# Patient Record
Sex: FEMALE | Race: WHITE | NOT HISPANIC OR LATINO | Employment: OTHER
[De-identification: names, ages, dates, MRNs, and addresses within clinical notes are randomized per-mention and may not be internally consistent; named-entity substitution may affect disease eponyms.]

---

## 2017-09-01 ENCOUNTER — HEALTH MAINTENANCE LETTER (OUTPATIENT)
Age: 62
End: 2017-09-01

## 2018-09-07 ENCOUNTER — HEALTH MAINTENANCE LETTER (OUTPATIENT)
Age: 63
End: 2018-09-07

## 2021-07-15 ENCOUNTER — HOSPITAL ENCOUNTER (OUTPATIENT)
Facility: CLINIC | Age: 66
Setting detail: OBSERVATION
Discharge: HOME | End: 2021-07-16
Attending: EMERGENCY MEDICINE | Admitting: EMERGENCY MEDICINE
Payer: MEDICARE

## 2021-07-15 ENCOUNTER — APPOINTMENT (OUTPATIENT)
Dept: MRI IMAGING | Facility: CLINIC | Age: 66
End: 2021-07-15
Attending: HOSPITALIST
Payer: MEDICARE

## 2021-07-15 ENCOUNTER — APPOINTMENT (OUTPATIENT)
Dept: CARDIOLOGY | Facility: CLINIC | Age: 66
End: 2021-07-15
Attending: HOSPITALIST
Payer: MEDICARE

## 2021-07-15 ENCOUNTER — APPOINTMENT (OUTPATIENT)
Dept: CT IMAGING | Facility: CLINIC | Age: 66
End: 2021-07-15
Attending: EMERGENCY MEDICINE
Payer: MEDICARE

## 2021-07-15 DIAGNOSIS — R42 VERTIGO: ICD-10-CM

## 2021-07-15 DIAGNOSIS — S06.0X1A CONCUSSION WITH LOSS OF CONSCIOUSNESS OF 30 MINUTES OR LESS, INITIAL ENCOUNTER: ICD-10-CM

## 2021-07-15 DIAGNOSIS — R53.83 FATIGUE, UNSPECIFIED TYPE: ICD-10-CM

## 2021-07-15 DIAGNOSIS — M62.81 GENERALIZED MUSCLE WEAKNESS: ICD-10-CM

## 2021-07-15 LAB
ALBUMIN SERPL-MCNC: 3.3 G/DL (ref 3.4–5)
ALBUMIN UR-MCNC: NEGATIVE MG/DL
ALP SERPL-CCNC: 80 U/L (ref 40–150)
ALT SERPL W P-5'-P-CCNC: 23 U/L (ref 0–50)
ANION GAP SERPL CALCULATED.3IONS-SCNC: 2 MMOL/L (ref 3–14)
APPEARANCE UR: CLEAR
AST SERPL W P-5'-P-CCNC: 26 U/L (ref 0–45)
ATRIAL RATE - MUSE: 73 BPM
BASOPHILS # BLD AUTO: 0.1 10E3/UL (ref 0–0.2)
BASOPHILS NFR BLD AUTO: 1 %
BILIRUB DIRECT SERPL-MCNC: 0.1 MG/DL (ref 0–0.2)
BILIRUB SERPL-MCNC: 0.8 MG/DL (ref 0.2–1.3)
BILIRUB UR QL STRIP: NEGATIVE
BUN SERPL-MCNC: 7 MG/DL (ref 7–30)
CALCIUM SERPL-MCNC: 8.7 MG/DL (ref 8.5–10.1)
CHLORIDE BLD-SCNC: 104 MMOL/L (ref 94–109)
CK SERPL-CCNC: 52 U/L (ref 30–225)
CO2 SERPL-SCNC: 29 MMOL/L (ref 20–32)
COLOR UR AUTO: ABNORMAL
CREAT SERPL-MCNC: 0.51 MG/DL (ref 0.52–1.04)
CRP SERPL-MCNC: <2.9 MG/L (ref 0–8)
DIASTOLIC BLOOD PRESSURE - MUSE: NORMAL MMHG
EOSINOPHIL # BLD AUTO: 0.5 10E3/UL (ref 0–0.7)
EOSINOPHIL NFR BLD AUTO: 5 %
ERYTHROCYTE [DISTWIDTH] IN BLOOD BY AUTOMATED COUNT: 12.8 % (ref 10–15)
GFR SERPL CREATININE-BSD FRML MDRD: >90 ML/MIN/1.73M2
GLUCOSE BLD-MCNC: 114 MG/DL (ref 70–99)
GLUCOSE UR STRIP-MCNC: NEGATIVE MG/DL
HBA1C MFR BLD: 4.9 % (ref 0–5.6)
HCT VFR BLD AUTO: 43.3 % (ref 35–47)
HGB BLD-MCNC: 14.7 G/DL (ref 11.7–15.7)
HGB UR QL STRIP: NEGATIVE
HOLD SPECIMEN: NORMAL
IMM GRANULOCYTES # BLD: 0.1 10E3/UL
IMM GRANULOCYTES NFR BLD: 1 %
INTERPRETATION ECG - MUSE: NORMAL
KETONES UR STRIP-MCNC: NEGATIVE MG/DL
LEUKOCYTE ESTERASE UR QL STRIP: ABNORMAL
LVEF ECHO: NORMAL
LYMPHOCYTES # BLD AUTO: 2.3 10E3/UL (ref 0.8–5.3)
LYMPHOCYTES NFR BLD AUTO: 25 %
MAGNESIUM SERPL-MCNC: 2.3 MG/DL (ref 1.6–2.3)
MCH RBC QN AUTO: 32.2 PG (ref 26.5–33)
MCHC RBC AUTO-ENTMCNC: 33.9 G/DL (ref 31.5–36.5)
MCV RBC AUTO: 95 FL (ref 78–100)
MONOCYTES # BLD AUTO: 0.6 10E3/UL (ref 0–1.3)
MONOCYTES NFR BLD AUTO: 7 %
NEUTROPHILS # BLD AUTO: 5.8 10E3/UL (ref 1.6–8.3)
NEUTROPHILS NFR BLD AUTO: 61 %
NITRATE UR QL: NEGATIVE
NRBC # BLD AUTO: 0 10E3/UL
NRBC BLD AUTO-RTO: 0 /100
P AXIS - MUSE: 61 DEGREES
PH UR STRIP: 7.5 [PH] (ref 5–7)
PLATELET # BLD AUTO: 315 10E3/UL (ref 150–450)
POTASSIUM BLD-SCNC: 3.6 MMOL/L (ref 3.4–5.3)
PR INTERVAL - MUSE: 220 MS
PROT SERPL-MCNC: 6.7 G/DL (ref 6.8–8.8)
QRS DURATION - MUSE: 88 MS
QT - MUSE: 444 MS
QTC - MUSE: 489 MS
R AXIS - MUSE: 23 DEGREES
RBC # BLD AUTO: 4.57 10E6/UL (ref 3.8–5.2)
RBC URINE: 35 /HPF
SARS-COV-2 RNA RESP QL NAA+PROBE: NEGATIVE
SODIUM SERPL-SCNC: 135 MMOL/L (ref 133–144)
SP GR UR STRIP: 1.02 (ref 1–1.03)
SYSTOLIC BLOOD PRESSURE - MUSE: NORMAL MMHG
T AXIS - MUSE: 37 DEGREES
TROPONIN I SERPL-MCNC: <0.015 UG/L (ref 0–0.04)
UROBILINOGEN UR STRIP-MCNC: NORMAL MG/DL
VENTRICULAR RATE- MUSE: 73 BPM
WBC # BLD AUTO: 9.4 10E3/UL (ref 4–11)
WBC URINE: 10 /HPF

## 2021-07-15 PROCEDURE — 36415 COLL VENOUS BLD VENIPUNCTURE: CPT | Performed by: EMERGENCY MEDICINE

## 2021-07-15 PROCEDURE — 87086 URINE CULTURE/COLONY COUNT: CPT | Performed by: EMERGENCY MEDICINE

## 2021-07-15 PROCEDURE — 83036 HEMOGLOBIN GLYCOSYLATED A1C: CPT | Performed by: HOSPITALIST

## 2021-07-15 PROCEDURE — 250N000011 HC RX IP 250 OP 636: Performed by: EMERGENCY MEDICINE

## 2021-07-15 PROCEDURE — 70496 CT ANGIOGRAPHY HEAD: CPT | Mod: MG

## 2021-07-15 PROCEDURE — 86140 C-REACTIVE PROTEIN: CPT | Performed by: HOSPITALIST

## 2021-07-15 PROCEDURE — 250N000013 HC RX MED GY IP 250 OP 250 PS 637: Mod: GY | Performed by: HOSPITALIST

## 2021-07-15 PROCEDURE — 70553 MRI BRAIN STEM W/O & W/DYE: CPT | Mod: MG

## 2021-07-15 PROCEDURE — 12013 RPR F/E/E/N/L/M 2.6-5.0 CM: CPT

## 2021-07-15 PROCEDURE — 36415 COLL VENOUS BLD VENIPUNCTURE: CPT | Performed by: HOSPITALIST

## 2021-07-15 PROCEDURE — 36592 COLLECT BLOOD FROM PICC: CPT | Performed by: EMERGENCY MEDICINE

## 2021-07-15 PROCEDURE — 83735 ASSAY OF MAGNESIUM: CPT | Performed by: HOSPITALIST

## 2021-07-15 PROCEDURE — 96375 TX/PRO/DX INJ NEW DRUG ADDON: CPT

## 2021-07-15 PROCEDURE — 82550 ASSAY OF CK (CPK): CPT | Performed by: HOSPITALIST

## 2021-07-15 PROCEDURE — 81001 URINALYSIS AUTO W/SCOPE: CPT | Performed by: EMERGENCY MEDICINE

## 2021-07-15 PROCEDURE — 255N000002 HC RX 255 OP 636: Performed by: HOSPITALIST

## 2021-07-15 PROCEDURE — 80048 BASIC METABOLIC PNL TOTAL CA: CPT | Performed by: EMERGENCY MEDICINE

## 2021-07-15 PROCEDURE — G0378 HOSPITAL OBSERVATION PER HR: HCPCS

## 2021-07-15 PROCEDURE — 99220 PR INITIAL OBSERVATION CARE,LEVEL III: CPT | Performed by: HOSPITALIST

## 2021-07-15 PROCEDURE — C9803 HOPD COVID-19 SPEC COLLECT: HCPCS

## 2021-07-15 PROCEDURE — 93005 ELECTROCARDIOGRAM TRACING: CPT

## 2021-07-15 PROCEDURE — 96374 THER/PROPH/DIAG INJ IV PUSH: CPT | Mod: 59

## 2021-07-15 PROCEDURE — 87635 SARS-COV-2 COVID-19 AMP PRB: CPT | Performed by: EMERGENCY MEDICINE

## 2021-07-15 PROCEDURE — 250N000013 HC RX MED GY IP 250 OP 250 PS 637: Performed by: EMERGENCY MEDICINE

## 2021-07-15 PROCEDURE — 258N000003 HC RX IP 258 OP 636: Performed by: HOSPITALIST

## 2021-07-15 PROCEDURE — 70450 CT HEAD/BRAIN W/O DYE: CPT | Mod: ME,XS

## 2021-07-15 PROCEDURE — A9585 GADOBUTROL INJECTION: HCPCS | Performed by: HOSPITALIST

## 2021-07-15 PROCEDURE — 250N000009 HC RX 250: Performed by: EMERGENCY MEDICINE

## 2021-07-15 PROCEDURE — 258N000003 HC RX IP 258 OP 636: Performed by: EMERGENCY MEDICINE

## 2021-07-15 PROCEDURE — 85025 COMPLETE CBC W/AUTO DIFF WBC: CPT | Performed by: EMERGENCY MEDICINE

## 2021-07-15 PROCEDURE — 93306 TTE W/DOPPLER COMPLETE: CPT

## 2021-07-15 PROCEDURE — 96361 HYDRATE IV INFUSION ADD-ON: CPT

## 2021-07-15 PROCEDURE — 84484 ASSAY OF TROPONIN QUANT: CPT | Performed by: EMERGENCY MEDICINE

## 2021-07-15 PROCEDURE — 93306 TTE W/DOPPLER COMPLETE: CPT | Mod: 26 | Performed by: INTERNAL MEDICINE

## 2021-07-15 PROCEDURE — 99285 EMERGENCY DEPT VISIT HI MDM: CPT | Mod: 25

## 2021-07-15 PROCEDURE — 82248 BILIRUBIN DIRECT: CPT | Performed by: HOSPITALIST

## 2021-07-15 RX ORDER — NALOXONE HYDROCHLORIDE 0.4 MG/ML
0.2 INJECTION, SOLUTION INTRAMUSCULAR; INTRAVENOUS; SUBCUTANEOUS
Status: DISCONTINUED | OUTPATIENT
Start: 2021-07-15 | End: 2021-07-16 | Stop reason: HOSPADM

## 2021-07-15 RX ORDER — ACETAMINOPHEN 325 MG/1
650 TABLET ORAL EVERY 6 HOURS PRN
Status: DISCONTINUED | OUTPATIENT
Start: 2021-07-15 | End: 2021-07-16 | Stop reason: HOSPADM

## 2021-07-15 RX ORDER — LIDOCAINE 40 MG/G
CREAM TOPICAL
Status: DISCONTINUED | OUTPATIENT
Start: 2021-07-15 | End: 2021-07-16 | Stop reason: HOSPADM

## 2021-07-15 RX ORDER — ACETAMINOPHEN 650 MG/1
650 SUPPOSITORY RECTAL EVERY 6 HOURS PRN
Status: DISCONTINUED | OUTPATIENT
Start: 2021-07-15 | End: 2021-07-16 | Stop reason: HOSPADM

## 2021-07-15 RX ORDER — MECLIZINE HCL 12.5 MG 12.5 MG/1
12.5 TABLET ORAL 3 TIMES DAILY PRN
Status: DISCONTINUED | OUTPATIENT
Start: 2021-07-15 | End: 2021-07-16 | Stop reason: HOSPADM

## 2021-07-15 RX ORDER — IOPAMIDOL 755 MG/ML
90 INJECTION, SOLUTION INTRAVASCULAR ONCE
Status: COMPLETED | OUTPATIENT
Start: 2021-07-15 | End: 2021-07-15

## 2021-07-15 RX ORDER — NICOTINE 21 MG/24HR
1 PATCH, TRANSDERMAL 24 HOURS TRANSDERMAL DAILY
Status: DISCONTINUED | OUTPATIENT
Start: 2021-07-15 | End: 2021-07-16 | Stop reason: HOSPADM

## 2021-07-15 RX ORDER — ONDANSETRON 2 MG/ML
4 INJECTION INTRAMUSCULAR; INTRAVENOUS EVERY 30 MIN PRN
Status: DISCONTINUED | OUTPATIENT
Start: 2021-07-15 | End: 2021-07-15

## 2021-07-15 RX ORDER — GADOBUTROL 604.72 MG/ML
5 INJECTION INTRAVENOUS ONCE
Status: COMPLETED | OUTPATIENT
Start: 2021-07-15 | End: 2021-07-15

## 2021-07-15 RX ORDER — NALOXONE HYDROCHLORIDE 0.4 MG/ML
0.4 INJECTION, SOLUTION INTRAMUSCULAR; INTRAVENOUS; SUBCUTANEOUS
Status: DISCONTINUED | OUTPATIENT
Start: 2021-07-15 | End: 2021-07-16 | Stop reason: HOSPADM

## 2021-07-15 RX ORDER — ACETAMINOPHEN 325 MG/1
650 TABLET ORAL ONCE
Status: COMPLETED | OUTPATIENT
Start: 2021-07-15 | End: 2021-07-15

## 2021-07-15 RX ORDER — SODIUM CHLORIDE 9 MG/ML
INJECTION, SOLUTION INTRAVENOUS CONTINUOUS
Status: ACTIVE | OUTPATIENT
Start: 2021-07-15 | End: 2021-07-16

## 2021-07-15 RX ORDER — MECLIZINE HYDROCHLORIDE 25 MG/1
25 TABLET ORAL ONCE
Status: COMPLETED | OUTPATIENT
Start: 2021-07-15 | End: 2021-07-15

## 2021-07-15 RX ORDER — ONDANSETRON 2 MG/ML
4 INJECTION INTRAMUSCULAR; INTRAVENOUS EVERY 6 HOURS PRN
Status: DISCONTINUED | OUTPATIENT
Start: 2021-07-15 | End: 2021-07-16

## 2021-07-15 RX ORDER — ONDANSETRON 2 MG/ML
4 INJECTION INTRAMUSCULAR; INTRAVENOUS EVERY 6 HOURS PRN
Status: DISCONTINUED | OUTPATIENT
Start: 2021-07-15 | End: 2021-07-16 | Stop reason: HOSPADM

## 2021-07-15 RX ORDER — ONDANSETRON 4 MG/1
4 TABLET, ORALLY DISINTEGRATING ORAL EVERY 6 HOURS PRN
Status: DISCONTINUED | OUTPATIENT
Start: 2021-07-15 | End: 2021-07-16 | Stop reason: HOSPADM

## 2021-07-15 RX ORDER — LORAZEPAM 2 MG/ML
0.5 INJECTION INTRAMUSCULAR ONCE
Status: COMPLETED | OUTPATIENT
Start: 2021-07-15 | End: 2021-07-15

## 2021-07-15 RX ORDER — ONDANSETRON 4 MG/1
4 TABLET, ORALLY DISINTEGRATING ORAL EVERY 6 HOURS PRN
Status: DISCONTINUED | OUTPATIENT
Start: 2021-07-15 | End: 2021-07-16

## 2021-07-15 RX ORDER — NITROGLYCERIN 0.4 MG/1
0.4 TABLET SUBLINGUAL EVERY 5 MIN PRN
Status: DISCONTINUED | OUTPATIENT
Start: 2021-07-15 | End: 2021-07-16 | Stop reason: HOSPADM

## 2021-07-15 RX ADMIN — SODIUM CHLORIDE 1000 ML: 9 INJECTION, SOLUTION INTRAVENOUS at 09:28

## 2021-07-15 RX ADMIN — IOPAMIDOL 70 ML: 755 INJECTION, SOLUTION INTRAVENOUS at 10:26

## 2021-07-15 RX ADMIN — SODIUM CHLORIDE: 9 INJECTION, SOLUTION INTRAVENOUS at 15:26

## 2021-07-15 RX ADMIN — GADOBUTROL 5 ML: 604.72 INJECTION INTRAVENOUS at 18:03

## 2021-07-15 RX ADMIN — ONDANSETRON 4 MG: 2 INJECTION INTRAMUSCULAR; INTRAVENOUS at 09:59

## 2021-07-15 RX ADMIN — MECLIZINE HYDROCHLORIDE 25 MG: 25 TABLET ORAL at 09:59

## 2021-07-15 RX ADMIN — LORAZEPAM 0.5 MG: 2 INJECTION INTRAMUSCULAR; INTRAVENOUS at 11:51

## 2021-07-15 RX ADMIN — ACETAMINOPHEN 650 MG: 325 TABLET, FILM COATED ORAL at 16:43

## 2021-07-15 RX ADMIN — ACETAMINOPHEN 650 MG: 325 TABLET, FILM COATED ORAL at 13:40

## 2021-07-15 RX ADMIN — SODIUM CHLORIDE 90 ML: 9 INJECTION, SOLUTION INTRAVENOUS at 10:26

## 2021-07-15 ASSESSMENT — ENCOUNTER SYMPTOMS
SHORTNESS OF BREATH: 0
DIZZINESS: 1
WEAKNESS: 1
FEVER: 0
VOMITING: 1
WOUND: 1
NUMBNESS: 0
HEADACHES: 0
COUGH: 0
NECK PAIN: 0

## 2021-07-15 ASSESSMENT — MIFFLIN-ST. JEOR: SCORE: 976.34

## 2021-07-15 NOTE — PHARMACY-ADMISSION MEDICATION HISTORY
Pharmacy Medication History  Admission medication history interview status for the 7/15/2021  admission is complete. See EPIC admission navigator for prior to admission medications     Location of Interview: Patient room  Medication history sources: Patient, Surescripts and Care Everywhere      In the past week, patient estimated taking medication this percent of the time: greater than 90%    Additional medication history information:   Patient denies any prescription medications    Medication reconciliation completed by provider prior to medication history? No    Time spent in this activity: 15 minutes    Prior to Admission medications    Medication Sig Last Dose Taking? Auth Provider   CALCIUM 500 MG OR TABS Take 1 tablet by mouth 2 times daily  7/14/2021 at Unknown time Yes Raina Alcantara MD   MULTIVITAMIN TABS   OR Take 1 tablet by mouth daily  7/14/2021 at am Yes Raina Alcantara MD   VITAMIN B COMPLEX OR TABS Take 1 tablet by mouth daily  7/14/2021 at am Yes Raina Alcantara MD   VITAMIN E 400 IU OR CAPS Take 400 Units by mouth daily  7/14/2021 at am Yes Raina Alcantara MD       The information provided in this note is only as accurate as the sources available at the time of update(s)

## 2021-07-15 NOTE — PROGRESS NOTES
A&Ox4. VSS on RA. Up with SB. Tylenol given for HA; says she cant take oxycodone because it upsets her stomach. Regular diet, tolerated small meal of toast and gelatin. IV infusing. Neuro intact. Tele NSR. Orthostatics positive. Echo done. MRI pending; checklist completed and faxed. Large scalp laceration on back of head; multiple staples in place with small amount of bloody drainage. CM/SW consuls pending.

## 2021-07-15 NOTE — ED TRIAGE NOTES
Pt presents by EMS from a hotel with complaints of dizziness and a syncopal episode. Pt reports she awoke at 0730 and felt dizzy, nauseous and went to get coffee and had syncopal episode. Pt is diaphoretic in ED, eyes closed while talking due to dizziness and states she feels weak all over. Pt has laceration present to posterior aspect of head. Pt denies neck pain. Pt states she is not on blood thinners.

## 2021-07-15 NOTE — PLAN OF CARE
PRIMARY DIAGNOSIS: VERTIGO    OUTPATIENT/OBSERVATION GOALS TO BE MET BEFORE DISCHARGE  1. Orthostatic performed: Yes:          Lying Orthostatic BP: 120/71         Sitting Orthostatic BP: 139/71         Standing Orthostatic BP: 115/75     2. Completion of appropriate imaging: No    3. Tolerating PO medications: No    4. Return to near baseline physical activity: No    5. Cleared for discharge by consultants (if involved): No    Discharge Planner Nurse   Safe discharge environment identified: No  Barriers to discharge: Yes       Entered by: Dennise Hurd 07/15/2021 4:24 PM     Please review provider order for any additional goals.   Nurse to notify provider when observation goals have been met and patient is ready for discharge.

## 2021-07-15 NOTE — PROGRESS NOTES
RECEIVING UNIT ED HANDOFF REVIEW    ED Nurse Handoff Report was reviewed by: Paula Ortiz RN on July 15, 2021 at 1:32 PM

## 2021-07-15 NOTE — PROVIDER NOTIFICATION
"MD Notification    Notified Person: MD    Notified Person Name: Mckenna    Notification Date/Time: 07/15/21 3:44 PM     Notification Interaction: Text page    purpose of Notification: \"FYI Echo completed and resulted. Orthostatics positive from sitting to standing; see chart. Thanks.\"    Orders Received:    Comments:      "

## 2021-07-15 NOTE — PROVIDER NOTIFICATION
MD Notification    Notified Person: MD    Notified Person Name: Mckenna    Notification Date/Time: 2:24 PM 07/15/21     Notification Interaction: text page    Purpose of Notification: Pt says she requested to be DNR/DNI. Current status is full code.     Orders Received:    Comments:

## 2021-07-15 NOTE — ED NOTES
Bed: ED12  Expected date:   Expected time:   Means of arrival:   Comments:  Candice Aguilera F syncope eta 0938

## 2021-07-15 NOTE — ED PROVIDER NOTES
History   Chief Complaint:  Syncope and Head Laceration       HPI   Cynthia Chaidez is a 66 year old female who presents with syncope and head laceration. Patient reports approximately 2 hours ago she began to feel dizzy and last remembers getting coffee. She proceeded to feel dizzy, weak, vomited, had a syncopal event, and woke up with a head laceration. Patient reports she had some dental work done 3 days ago with no anesthesia. Patient denies any chest pain, shortness of breath, fever, cough, loss of taste, loss of smell, neck pain, headaches, numbness or weakness on one side of the body. Patient is not COVID vaccinated and is not on blood thinners. She notes she has never had this before and denies any other previous falls. Patient also notes she is here visiting from [place].     Review of Systems   Constitutional: Negative for fever.   Respiratory: Negative for cough and shortness of breath.    Cardiovascular: Negative for chest pain.   Gastrointestinal: Positive for vomiting.   Musculoskeletal: Negative for neck pain.   Skin: Positive for wound (head laceration).   Neurological: Positive for dizziness, syncope and weakness. Negative for numbness and headaches.   All other systems reviewed and are negative.        Allergies:  Hydrocodone-acetaminophen    Medications:  The patient is not currently taking any prescribed medications.    Past Medical History:    Bilateral sensorineural hearing loss  Tobacco abuse  Diffuse cystic mastopathy    Past Surgical History:    Cryocautery of cervix  Hysteroscopy  Repair left inguinal hernia, reducible   Total knee arthroplasty, bilateral    Family History:    Hypertension  Heart Disease  Cerebrovascular disease  Cancer  Osteoporosis  Depression  Migraines    Social History:  Patient presents alone.    Physical Exam     Patient Vitals for the past 24 hrs:   BP Temp Temp src Pulse Resp SpO2 Height Weight   07/15/21 1517 120/71 (!) 96.3  F (35.7  C) Oral 86 18 94 % -- --  "  07/15/21 1341 115/72 (!) 96.4  F (35.8  C) -- 89 18 93 % -- --   07/15/21 1300 96/55 -- -- 86 -- 95 % -- --   07/15/21 1250 102/65 -- -- 86 -- 94 % -- --   07/15/21 1150 -- -- -- -- -- 94 % -- --   07/15/21 1130 114/62 -- -- 85 -- -- -- --   07/15/21 1100 111/68 -- -- 76 -- 97 % -- --   07/15/21 1010 -- -- -- 78 18 96 % -- --   07/15/21 1000 116/82 -- -- 75 14 97 % -- --   07/15/21 0945 -- -- -- 75 10 99 % -- --   07/15/21 0930 (!) 129/90 -- -- 72 12 95 % -- --   07/15/21 0921 -- 96.9  F (36.1  C) Temporal -- -- -- -- --   07/15/21 0917 122/87 -- -- 69 20 97 % 1.549 m (5' 1\") 49.9 kg (110 lb)       Physical Exam  General: Patient is alert and generalized fatigue appearing.  HEENT: Head atraumatic    Eyes: pupils equal and reactive. Conjunctiva clear   Nares: patent   Oropharynx: no lesions, uvula midline, no palatal draping, normal voice, no trismus  Neck: Supple without lymphadenopathy, no meningismus  Chest: Heart regular rate and rhythm.   Lungs: Equal clear to auscultation with no wheeze or rales  Abdomen: Soft, non tender, nondistended, normal bowel sounds  Back: No costovertebral angle tenderness, no midline C, T or L spine tenderness  Neuro: Grossly nonfocal, normal speech, strength equal bilaterally, CN 2-12 intact, no pronator drift, normal finger-to-nose  Extremities: No deformities, equal radial and DP pulses. No clubbing, cyanosis.  No edema  Skin: Warm and dry with no rash.       Emergency Department Course   ECG  ECG taken at 0921, ECG read at 0924  Sinus rhythm with 1st degree AV block   Rate 73 bpm. UT interval 220 ms. QRS duration 88 ms. QT/QTc 444/489 ms. P-R-T axes 61 23 37.     Imaging:  CTA Head Neck with Contrast  Patent arteries in the head and neck without vascular   cutoff. No evidence of dissection. No aneurysm identified. No   significant stenosis.  Per radiology    CT Head w/o Contrast  1. No evidence of acute intracranial hemorrhage, mass, or herniation.   2. Posterior scalp soft " tissue injury without underlying calvarial   fracture.   Per radiology      Laboratory:  Symptomatic COVID: Negative  BMP: Anion gap 2 (L), Creatinine 0.51 (L), Glucose 114 (H) o/w WNL  Troponin (Collected 0924): <0.015  CBC: WBC 9.4, HGB 14.7,    UA with microscopic: pH Urine 7.5 (H), Leukocyte Esterase Urine Small (A), RBC Urine 35 (H), WBC Urine 10 (H) o/w WNL   Urine culture: in process    Procedures    Laceration Repair        LACERATION:  A stellate clean 4 cm laceration.      LOCATION: Posterior scalp      FUNCTION:  Distally sensation are intact.      ANESTHESIA:  Local using 1% lidocaine with epinephrine total of 4 mLs      PREPARATION:  Irrigation with Normal Saline      DEBRIDEMENT:  no debridement      CLOSURE:  Wound was closed with 17 Staples      Emergency Department Course:    Reviewed:  I reviewed nursing notes, vitals, past medical history and care everywhere    Assessments:  0914 I obtained history and examined the patient as noted above.   1106 I rechecked the patient and explained findings.  Attempted to walk the patient.  She was able to walk to the bathroom but had acute worsening of her dizziness with the room spinning sensation and nausea.      Consults:   1243 I spoke with Dr. Andres of the hospitalist service regarding patient's presentation, findings, and plan of care. They accepted the patient for admission.       Interventions:  0928 0.9% sodium chloride BOLUS 1,000 mL IV  0959 Zofran 4 mg IV  0959 Antivert 25 mg PO  1151 Ativan 0.5 mg IV  1340 Tylenol 650 mg PO    Disposition:  The patient was admitted to the hospital under the care of Dr. Andres.       Impression & Plan         Medical Decision Making:  Patient is a 66-year-old female who presents to the emergency department with closed head injury.  Patient had a syncopal versus vertiginous fall episode.  She did lose consciousness.  She has a large hematoma and complex laceration to her posterior scalp which was repaired  as noted above.  Patient describes a spinning type feeling and associated nausea.  This gets worse with getting up to stand.  Head CT and CTA were therefore obtained.  No evidence of posterior circulation abnormality.  Remainder of her neuro exam is negative for signs of stroke although given her prolonged symptoms MRI to rule out stroke is reasonable.  Patient likely needs vestibular therapy.  She also had some components of viral syndrome.  She is unvaccinated for Covid and recently traveled here from [place].  Initial Covid screen is negative.  Patient denies any chest pain or shortness of breath.  EKG without acute ischemic changes and troponin negative.  Do not suspect pulmonary embolism or dissection.  No source of infection identified yet at this time.  Urinalysis without specific signs of urinary tract infection although she did have some white cells therefore culture was ordered.  She denies dysuria, urgency, frequency.  She denied abdominal pain and had a benign abdominal exam and would not pursue CT scan of her abdomen at this time.  We will plan to admit the patient to observation for vestibular PT, hydration, continued work-up for her symptoms.  Patient is agreeable with this plan and all questions and concerns addressed.      Covid-19  Cynthia Chaidez was evaluated during a global COVID-19 pandemic, which necessitated consideration that the patient might be at risk for infection with the SARS-CoV-2 virus that causes COVID-19.   Applicable protocols for evaluation were followed during the patient's care.   COVID-19 was considered as part of the patient's evaluation. The plan for testing is:  a test was obtained during this visit.    Diagnosis:    ICD-10-CM    1. Vertigo  R42    2. Concussion with loss of consciousness of 30 minutes or less, initial encounter  S06.0X1A    3. Fatigue, unspecified type  R53.83    4. Generalized muscle weakness  M62.81          Scribe Disclosure:  ML SMITH MEREDITH, am  serving as a scribe at 9:14 AM on 7/15/2021 to document services personally performed by Ryann Licona MD based on my observations and the provider's statements to me.            Ryann Licona MD  07/15/21 1533

## 2021-07-15 NOTE — ED NOTES
Pt states she does not feel better after IV zofran and meclizine. Pt report still having HA and dizziness, nausea slightly improved

## 2021-07-15 NOTE — H&P
[facility]    History and Physical  Hospitalist    Cynthia Chaidez MRN# [id number]   Age: 66 year old YOB: 1955     Date of Admission:  7/15/2021    Primary care provider: Raina Alcantara          Assessment and Plan:       Cynthia Chaidez is a 66 year old  female with no previous significant rectal history brought into the ED via EMS from [facility] with syncopal episode.    Ongoing dizziness status post fall.  Syncopal event versus concussion injury of brain status post fall.  Traumatic posterior scalp hematoma.  Patient reports feeling dizzy, nauseous for almost 2 hours in the morning, last remembers getting coffee, subsequently had a syncopal event.  Reports does not remember anything after the event and the staff at [facility] had called EMS in. No Similar episodes in the past  In ED patient diaphoretic, wrapped up in blankets.  Eyes closed while talking due to dizziness and states she feels weak all over. Has sustained laceration to the posterior aspect of the head, staples placed in ED.  Patient unable to get up/walk or care for self due to dizziness, received meclizine and Zofran, Ativan without any relief of symptoms.  Hence Plan to admit to observation unit.  ER afebrile, blood pressure 122/87, heart rate 69.  EKG with sinus rhythm first-degree AV block, heart rate 73, QTc 489.  CT head with Posterior scalp soft tissue injury without underlying calvarial  fracture.  No evidence of acute intracranial hemorrhage, mass or lesion.  CTA head and neck with contrast with recent head and neck without vascular cutoff.  No evidence of dissection.  WBC 9.4, hemoglobin 14.7.  Troponin undetectable.  Creatinine 0.7, glucose 104.  Admit to observation unit.  Telemetry monitoring.  Follow trend troponin.  We will check TSH, CK levels, magnesium  Echocardiogram for evaluation of heart function, valvular abnormalities.  Neurochecks.  MRI brain to rule out intracranial  pathology/stroke.  Orthostatic vital signs.  Physical Therapy evaluation.  IV hydration with normal saline at 100 mL/h for 10 hours.  As needed meclizine.  As needed IV/p.o. Zofran.  As needed Tylenol as needed for mild headache, oxycodone as needed for moderate to severe headache.  Staples removal in 7 to 10 days.  Consider neurology evaluation if symptoms not improving.  Fall precautions.    Generalized weakness.  Patient complaining of generalized weakness. Reports had moved to [place] in May, had flew in July 4 dental work-up [has establish dental care in [facility], undergoing dental implant]  Not vaccinated for COVID-19.  Asymptomatic COVID-19 negative.  Patient without any cough or shortness of breath.  No fevers hence no indication for retesting.  Ambulate as able to.  Physical therapy evaluation.  Check CRP, CK, TSH levels.  Further work-up as above.  Educated patient to consider vaccination for COVID-19, reports does not trust the vaccine.  Will benefit from revisiting this discussion again.    Nicotine dependence.  Reports smokes 10 cigarettes a day.  We discussed on smoking cessation.  Nicotine patch.    Moderate alcohol use.     Reports drinks 2 vodka drinks every night, last drink last night.  Recommended to limit to 1 alcoholic drink per day.  Check Liver enzymes.    Mild hyperglycemia.  Check hemoglobin A1c.    DVT Prophylaxis: Pneumatic Compression Devices, ambulate   Code Status: DNR/DNI, discussed with patient     Disposition: Expected discharge in 2 days pending clinical improvement.  More than 60% of time spent in direct patient care, care coordination, patient counseling, and formalizing plan of care. Discussed with patient and ED team.     Elías Andres MD          Chief Complaint:     History is obtained from the patient     Cynthia Chaidez is a 66 year old  female with no previous significant rectal history brought into the ED via EMS from [facility] with syncopal  episode.    Patient reports feeling dizzy, nauseous for almost 2 hours in the morning, last remembers getting coffee, subsequently had a syncopal event.  Reports does not remember anything after the event, the staff at [facility] had called EMS in. No Similar episodes in the past.  Reports had moved to [place] in May, had flew in July 4 dental work-up [has establish dental care in the [facility], undergoing dental implant]  Denies any chest pain or palpitation.  Denies any shortness of breath or cough.  Denies any fever or wheezing.  Denies any new tingling or numbness.  No slurred speech.  No headaches.  Denies any focal weakness.  Denies any loss of taste, loss of smell or new neck pain.  Reports has not had the vaccination for COVID-19, does not trust it.  Prior to admission not on blood thinner medication.    In ED patient diaphoretic, wrapped up in blankets.  Eyes closed while talking due to dizziness and states she feels weak all over. Has sustained laceration to the posterior aspect of the head, staples placed in ED.  Patient unable to get up/walk or care for self due to dizziness, received meclizine and Zofran, Ativan without any relief of symptoms.  Hence Plan to admit to observation unit.    ER afebrile, blood pressure 122/87, heart rate 69.  EKG with sinus rhythm first-degree AV block, heart rate 73, QTc 489.  CT head with Posterior scalp soft tissue injury without underlying calvarial  fracture.  No evidence of acute intracranial hemorrhage, mass or lesion.  CTA head and neck with contrast with recent head and neck without vascular cutoff.  No evidence of dissection.  WBC 9.4, hemoglobin 14.7.  Troponin undetectable.  Creatinine 0.7, glucose 104.  Asymptomatic COVID-19 negative.          Review of Systems:     GENERAL: no fever or chills  EENT: No new vision changes, no difficulty swallowing, no hearing difficulty  PULMONARY: No shortness of breath, no cough  CARDIAC: no chest pain, no irregular  heart beats   GI: No abdominal pain, nausea, vomiting, diarrhea, constipation, black or bloody stools  : No burning/pain with urination  NEURO: no seizures   ENDOCRINE: No excessive thirst  MUSCULOSKELETAL: No new joint pain  SKIN: No skin rashes  PSYCHIATRY appears anxious.    Medical History:     Past Medical History:   Diagnosis Date     Diffuse cystic mastopathy         Surgical History:      Past Surgical History:   Procedure Laterality Date     CRYOCAUTERY OF CERVIX  1986    FOR ABNORMAL PAPS- NORMAL SINCE     HC HYSTEROSCOPY DIAGOSTIC (SEPARATE PROC)  1986    FOR iud LEFT IN TOO LONG     HC REPAIR RECURR INGUIN CYRUS,REDUCIBL      LEFT INGUINAL             Social History:      Social History     Tobacco Use     Smoking status: Former Smoker     Packs/day: 0.50     Years: 15.00     Pack years: 7.50     Types: Cigarettes     Tobacco comment: quit 1987   Substance Use Topics     Alcohol use: Yes     Comment: wine in the sonali             Family History:     Family History   Problem Relation Age of Onset     Hypertension Father      Heart Disease Father      Cerebrovascular Disease Paternal Uncle      Cerebrovascular Disease Grandchild      Cancer Paternal Grandfather         unsure of type     Osteoporosis Mother      Depression Mother      Osteoporosis Maternal Grandmother         and great grandmother also     Depression Maternal Grandmother         Great grandmother     Osteoporosis Maternal Aunt              Allergies:     Allergies   Allergen Reactions     No Known Drug Allergies              Medications:   Home medications reviewed.         Physical Exam      Admission Weight: 49.9 kg (110 lb)  Current Weight: 49.9 kg (110 lb)    Vital Signs with Ranges  Temp:  [96.9  F (36.1  C)] 96.9  F (36.1  C)  Pulse:  [69-85] 85  Resp:  [10-20] 18  BP: (111-129)/(62-90) 114/62  SpO2:  [94 %-99 %] 94 %    Intake/Output Summary (Last 24 hours) at 7/15/2021 1246  Last data filed at 7/15/2021 1047  Gross per 24 hour    Intake 1000 ml   Output --   Net 1000 ml       PHYSICAL EXAM  GENERAL: Patient wrapped up in blankets, oriented.  Eyes closed while talking.  HEENT: Oropharynx pink, moist. Pupils equal reacting to light.  HEART: Regular rate and rhythm. S1S2. No murmurs  LUNGS: Clear to auscultation bilaterally. No expiratory wheeze.  Respirations unlabored  ABDOMEN: Soft, no abdominal tenderness, bowel sounds heard   NEURO: Cranial nerves grossly intact.  Moving all extremities strength 5/5 in all extremities.  Staples in the occipital area noted.  EXTREMITIES: No pedal edema.  SKIN: Warm, dry. No rash or bruising.  PSYCHIATRY Cooperative         Data:   All new lab and imaging data was reviewed.

## 2021-07-15 NOTE — ED NOTES
[facility]  ED Nurse Handoff Report    ED Chief complaint: Syncope and Head Laceration      ED Diagnosis:   Final diagnoses:   None       Code Status: Full Code    Allergies:   Allergies   Allergen Reactions     No Known Drug Allergies        Patient Story: Pt presents by EMS from a hotel where she states she is staying because she recently moved to [place] but returned to [place] to have some dental work done. Pt had dental work on Monday but had not had it all finished yet. This morning pt felt dizzy, room spinning and went to get coffee and had syncopal episode, hit back of head hard and has laceration present that was stapled in ED. Pt complaining of dizziness and nausea, feeling like she cant get up/walk/care for self due to dizziness. Pt given meclizine, zofran and ativan without relief. Has large hematoma to the posterior head   Focused Assessment:  A/o x4, keeps eyes closed, moving all extremities, reports dizziness but no vomiting    Treatments and/or interventions provided: IV meds, blood work, head ct performed  Patient's response to treatments and/or interventions: Not feeling improved after meds    To be done/followed up on inpatient unit:  inpt orders    Does this patient have any cognitive concerns?: none    Activity level - Baseline/Home:  Independent  Activity Level - Current:   Stand with Assist    Patient's Preferred language: English   Needed?: No    Isolation: None  Infection: Not Applicable  Patient tested for COVID 19 prior to admission: YES  Bariatric?: No    Vital Signs:   Vitals:    07/15/21 1010 07/15/21 1100 07/15/21 1130 07/15/21 1150   BP:  111/68 114/62    Pulse: 78 76 85    Resp: 18      Temp:       TempSrc:       SpO2: 96% 97%  94%   Weight:       Height:           Cardiac Rhythm:Cardiac Rhythm: Normal sinus rhythm    Was the PSS-3 completed:   Yes  What interventions are required if any?               Family Comments: none present- here alone from [place]  OBS  brochure/video discussed/provided to patient/family: N/A              Name of person given brochure if not patient: n/a              Relationship to patient: n/a    For the majority of the shift this patient's behavior was Green.   Behavioral interventions performed were none.    ED NURSE PHONE NUMBER: *[phone]

## 2021-07-16 VITALS
SYSTOLIC BLOOD PRESSURE: 143 MMHG | RESPIRATION RATE: 16 BRPM | WEIGHT: 110 LBS | DIASTOLIC BLOOD PRESSURE: 81 MMHG | HEART RATE: 72 BPM | OXYGEN SATURATION: 97 % | BODY MASS INDEX: 20.77 KG/M2 | HEIGHT: 61 IN | TEMPERATURE: 95.9 F

## 2021-07-16 LAB
ANION GAP SERPL CALCULATED.3IONS-SCNC: 5 MMOL/L (ref 3–14)
BACTERIA UR CULT: NO GROWTH
BUN SERPL-MCNC: 5 MG/DL (ref 7–30)
CALCIUM SERPL-MCNC: 8.3 MG/DL (ref 8.5–10.1)
CHLORIDE BLD-SCNC: 105 MMOL/L (ref 94–109)
CO2 SERPL-SCNC: 26 MMOL/L (ref 20–32)
CREAT SERPL-MCNC: 0.54 MG/DL (ref 0.52–1.04)
GFR SERPL CREATININE-BSD FRML MDRD: >90 ML/MIN/1.73M2
GLUCOSE BLD-MCNC: 92 MG/DL (ref 70–99)
HGB BLD-MCNC: 12.8 G/DL (ref 11.7–15.7)
POTASSIUM BLD-SCNC: 3.5 MMOL/L (ref 3.4–5.3)
SODIUM SERPL-SCNC: 136 MMOL/L (ref 133–144)
TSH SERPL DL<=0.005 MIU/L-ACNC: 1.14 MU/L (ref 0.4–4)

## 2021-07-16 PROCEDURE — 250N000013 HC RX MED GY IP 250 OP 250 PS 637: Mod: GY | Performed by: HOSPITALIST

## 2021-07-16 PROCEDURE — G0378 HOSPITAL OBSERVATION PER HR: HCPCS

## 2021-07-16 PROCEDURE — 85018 HEMOGLOBIN: CPT | Performed by: HOSPITALIST

## 2021-07-16 PROCEDURE — 99217 PR OBSERVATION CARE DISCHARGE: CPT | Performed by: PHYSICIAN ASSISTANT

## 2021-07-16 PROCEDURE — 36415 COLL VENOUS BLD VENIPUNCTURE: CPT | Performed by: HOSPITALIST

## 2021-07-16 PROCEDURE — 80048 BASIC METABOLIC PNL TOTAL CA: CPT | Performed by: HOSPITALIST

## 2021-07-16 PROCEDURE — 84443 ASSAY THYROID STIM HORMONE: CPT | Performed by: HOSPITALIST

## 2021-07-16 RX ADMIN — ACETAMINOPHEN 650 MG: 325 TABLET, FILM COATED ORAL at 09:41

## 2021-07-16 RX ADMIN — ACETAMINOPHEN 650 MG: 325 TABLET, FILM COATED ORAL at 00:45

## 2021-07-16 NOTE — PLAN OF CARE
Observation goals PRIOR TO DISCHARGE    Comments: List all  goals to be met before discharge:   - Diagnostic tests and consults completed and resulted  NOT MET   - No further episodes of syncope and any new arrhythmia addressed with controlled heart rates Partially Met  - Vital signs normal or at patient baseline and orthostatic vitals are normal and patient not lightheaded with standing Partially Met  - Tolerating oral intake to maintain hydration MET   - Safe disposition plan has been identified Not Met  - Nurse to notify provider when observation goals have been met and patient is ready for discharge.

## 2021-07-16 NOTE — DISCHARGE SUMMARY
[facility]  Hospitalist Discharge Summary      Date of Admission:  7/15/2021  Date of Discharge:  7/16/2021  Discharging Provider: Berenice Swanson PA-C      Discharge Diagnoses   Ongoing dizziness status post fall, resolved  Orthostatic hypotension, resolved  Syncopal event versus concussion injury of brain status post fall  Traumatic posterior scalp hematoma with use of cutaneous staples  Generalized weakness, resolved  Mild hyperglycemia without diagnosis of diabetes    Follow-ups Needed After Discharge   Follow-up Appointments     Follow-up and recommended labs and tests       Follow up with primary care clinic, within 7-9 days staple removal and   hospital follow up.         Follow-up and recommended labs and tests       [facility]  Dr Paige Schroeder  Saturday, July 24, 2021  10:20 AM  [address] [place] [address]  [phone]             Unresulted Labs Ordered in the Past 30 Days of this Admission     Date and Time Order Name Status Description    7/15/2021 11:59 AM Urine Culture Preliminary       These results will be followed up by PCP    Discharge Disposition   Discharged to home  Condition at discharge: Stable    Hospital Course   Cynthia Chaidez is a 66 year old  female with no previous significant rectal history brought into the ED via EMS from [facility] with syncopal episode. She was registered to observation for further workup. For full HPI please see admission H&P from Dr. Elías Andres dated 7/15/21.      Ongoing dizziness status post fall  Syncopal event versus concussion injury of brain status post fall  Traumatic posterior scalp hematoma  Patient reported feeling dizzy, nauseous for almost 2 hours in the morning, last remembers getting coffee, subsequently had a syncopal event.  Reports does not remember anything after the event and the staff at [facility] had called EMS in. No Similar episodes in the past. She notes  she skipped dinner the night prior and had 2 vodka drinks that night as well as coffee in the morning prior to her syncopal episode. In ED patient diaphoretic, wrapped up in blankets.  Eyes closed while talking due to dizziness and states she feels weak all over. Has sustained laceration to the posterior aspect of the head, staples placed in ED. Patient unable to get up/walk or care for self due to dizziness, received meclizine and Zofran, Ativan without any relief of symptoms.  Therefore she was registered to observation for further management and monitoring.   EKG with sinus rhythm first-degree AV block, heart rate 73, QTc 489.   CT head with Posterior scalp soft tissue injury without underlying calvarial  fracture.  No evidence of acute intracranial hemorrhage, mass or lesion.  CTA head and neck with contrast with recent head and neck without vascular cutoff.  No evidence of dissection.  No leukocytosis. Hgb normal. Troponin undetectable.  Renal function wnl. Glucose 104.  ECHO: EF 60-65%, LV systolic fxn normal, No RWMA  Brain MRI normal  TSH/CK/CRP/Mg++/HbA1c WNL  Monitored on telemetry without arrhythmias. Neurochecks stable. Orthostatic vital signs initially positive --> negative after IVF. Improved and ambulated around room, did not require PT consult formally. Tolerated IV fluids. Dizziness and weakness improved with fluids and patient felt ready to discharge. Suspect patient dehydrated from skipping dinner, drinking EtOH, and having coffee in the AM along with orthostatic hypotension. Staples removal in 7 to 10 days. I offered holter monitor on discharge however patient declined. I explained though we suspect symptoms were related to dehydration and orthostatic hypotension we cannot formally rule out a cardiac arrhythmia. She understands and again declined the monitor. Instructed to return to the ED if recurrence, CP, palpitations, SOB, lightheadedness, or dizziness. Instructed to stay hydrated, careful  with changing positions, and encouraged good oral intake along with avoiding EtOH.     Generalized weakness, resolved  Patient complaining of generalized weakness. Reports had moved to [place] in May, had flew in July 4 dental work-up [has establish dental care in the [facility], undergoing dental implant]  Not vaccinated for COVID-19.  Asymptomatic COVID-19 negative.  Patient without any cough or shortness of breath.  No fevers hence no indication for retesting.  Ambulate as able to.    CRP, CK, TSH WNL  Educated patient to consider vaccination for COVID-19, reports does not trust the vaccine. Will benefit from revisiting this discussion again.     Nicotine dependence.  Reports smokes 10 cigarettes a day.  We discussed on smoking cessation.  Nicotine patch.     Moderate alcohol use.     Reports drinks 2 vodka drinks every night, last drink last night. Recommended to limit to 1 alcoholic drink per day. Liver enzymes wnl     Mild hyperglycemia: HbA1c WNL    Consultations This Hospital Stay   CARE MANAGEMENT / SOCIAL WORK IP CONSULT  PHYSICAL THERAPY ADULT IP CONSULT    Code Status   Prior    Time Spent on this Encounter   I, Berenice Swanson PA-C, personally saw the patient today and spent greater than 30 minutes discharging this patient.       Berenice Swanson PA-C  [facility] OBSERVATION  [address] [place] [id number]  Phone: [phone]  ______________________________________________________________________    Physical Exam   Vital Signs: Temp: (!) 95.9  F (35.5  C) Temp src: Oral BP: (!) 143/81 Pulse: 72   Resp: 16 SpO2: 97 % O2 Device: None (Room air)    Weight: 110 lbs 0 oz    Physical Exam  Vitals reviewed.   Constitutional:       General: She is not in acute distress.     Appearance: Normal appearance.   HENT:      Head: Normocephalic.      Comments: Posterior head lac with staples in place, no discharge, intact.  Eyes:      Extraocular Movements: Extraocular  movements intact.   Cardiovascular:      Rate and Rhythm: Normal rate and regular rhythm.      Pulses: Normal pulses.      Heart sounds: No murmur heard.     Abdominal:      General: Bowel sounds are normal. There is no distension.      Palpations: Abdomen is soft.      Tenderness: There is no abdominal tenderness. There is no guarding.   Musculoskeletal:         General: Normal range of motion.   Skin:     General: Skin is warm and dry.   Neurological:      Mental Status: She is alert and oriented to person, place, and time.   Psychiatric:         Mood and Affect: Mood normal.         Behavior: Behavior normal.              Primary Care Physician   Raina Alcantara    Discharge Orders      Reason for your hospital stay    Admitted with a head laceration, fall, and episode of fainting.     Follow-up and recommended labs and tests     Follow up with primary care clinic, within 7-9 days staple removal and hospital follow up.     Activity    Your activity upon discharge: activity as tolerated     Discharge Instructions    Staples to be removed 7-10 days after placement  Stay hydrated  Avoid alcohol   Consider COVID19 vaccination  If you feel chest pain, lightheadedness, dizziness, heart racing or fluttering, shortness of breath or if you faint again you should return to the emergency department.     Follow-up and recommended labs and tests     [facility]  Dr Paige Schroeder  Saturday, July 24, 2021  10:20 AM  [address] [place] [address]  [phone]     Diet    Follow this diet upon discharge: Orders Placed This Encounter      Regular Diet Adult       Significant Results and Procedures   Results for orders placed or performed during the hospital encounter of 07/15/21   CT Head w/o Contrast    Narrative    CT SCAN OF THE HEAD WITHOUT CONTRAST   7/15/2021 10:33 AM     HISTORY: Head trauma, minor (Age >= 65y).    TECHNIQUE:  Axial images of the head and coronal reformations  without  IV contrast material. Radiation dose for this scan was reduced using  automated exposure control, adjustment of the mA and/or kV according  to patient size, or iterative reconstruction technique.    COMPARISON: None.    FINDINGS: There is no evidence of intracranial hemorrhage, mass, acute  infarct or anomaly. The ventricles are normal in size, shape and  configuration. The brain parenchyma and subarachnoid spaces are  normal.     Moderate mucosal thickening throughout the paranasal sinuses.    Posterior scalp soft tissue injury. No underlying calvarial fracture.      Impression    IMPRESSION:     1. No evidence of acute intracranial hemorrhage, mass, or herniation.  2. Posterior scalp soft tissue injury without underlying calvarial  fracture.    CAYETANO ARCOS MD         SYSTEM ID:  [id number]   CTA Head Neck with Contrast    Narrative    CT ANGIOGRAM OF THE HEAD AND NECK WITH CONTRAST  7/15/2021 10:41 AM     HISTORY: Dizziness, nonspecific.    TECHNIQUE:  CT angiography with an injection of 70 mL [id number]-370 IV  with scans through the head and neck. Images were transferred to a  separate 3-D workstation where multiplanar reformations and 3-D images  were created. Estimates of carotid stenoses are made relative to the  distal internal carotid artery diameters except as noted. Radiation  dose for this scan was reduced using automated exposure control,  adjustment of the mA and/or kV according to patient size, or iterative  reconstruction technique.      COMPARISON: None.     CT HEAD FINDINGS: No contrast enhancing lesions. Cerebral blood flow  is grossly normal.     CT ANGIOGRAM HEAD FINDINGS:  The major intracranial arteries including  the proximal branches of the anterior cerebral, middle cerebral, and  posterior cerebral arteries appear patent without vascular cutoff. No  aneurysm identified. No significant stenosis. Venous circulation is  unremarkable.     CT ANGIOGRAM NECK FINDINGS: Normal origin of the  great vessels from  the aortic arch.     Right carotid artery: The right common and internal carotid arteries  are patent. No significant stenosis or atherosclerotic disease in the  carotid artery.     Left carotid artery: The left common and internal carotid arteries are  patent. No significant stenosis or atherosclerotic disease in the  carotid artery.     Vertebral arteries: Vertebral arteries are patent without evidence of  dissection. No significant stenosis.     Other findings: None.       Impression    IMPRESSION: Patent arteries in the head and neck without vascular  cutoff. No evidence of dissection. No aneurysm identified. No  significant stenosis.     CAYETANO ARCOS MD         SYSTEM ID:  [id number]   MR Brain w/o & w Contrast    Narrative    MRI OF THE BRAIN WITHOUT AND WITH CONTRAST 7/15/2021 6:49 PM     COMPARISON: Head CT 7/15/2021.    HISTORY:  Dizziness, nonspecific     TECHNIQUE: Multi-sequence, multi-planar MRI images of the brain were  acquired before and after the administration of IV gadolinium (5 mL  Gadavist).    FINDINGS: The ventricles and basal cisterns are normal in  configuration. There is no midline shift. There are no extra-axial  fluid collections. Gray-white differentiation is well maintained.  There is no evidence for stroke or acute intracranial hemorrhage.  There is no abnormal contrast enhancement in the brain or its  coverings.    There is no sinusitis or mastoiditis.      Impression    IMPRESSION: Normal brain MRI.    KATIA QUINONEZ MD         SYSTEM ID:  [id number]   Echocardiogram Complete     Value    LVEF  60-65%    Narrative    [id number]  [id number]  [id number]  [id number]^NISHA     [facility]  Echocardiography Laboratory  [address] [place] [address]     Name: MOISE STOREY  MRN: [id number]  : 1955  Study Date: 07/15/2021 02:33 PM  Age: 66 yrs  Gender: Female  Patient Location: [facility]  Reason For Study: Syncope  Ordering Physician:  NISHA VILLEGAS  Referring Physician: Raina Alcantara  Performed By: Benji Cruz     BSA: 1.5 m2  Height: 61 in  Weight: 110 lb  HR: 88  BP: 115/72 mmHg  ______________________________________________________________________________  Procedure  Complete Portable Echo Adult.  ______________________________________________________________________________  Interpretation Summary     Left ventricular systolic function is normal.  The visual ejection fraction is 60-65%.  No regional wall motion abnormalities noted.  The study was technically adequate. There is no comparison study available.  ______________________________________________________________________________  Left Ventricle  The left ventricle is normal in size. There is normal left ventricular wall  thickness. Left ventricular systolic function is normal. The visual ejection  fraction is 60-65%. Left ventricular diastolic function is indeterminate. No  regional wall motion abnormalities noted.     Right Ventricle  The right ventricle is normal size. The right ventricular systolic function is  normal.     Atria  The left atrium is mildly dilated. Right atrial size is normal.     Mitral Valve  There is mild mitral annular calcification. There is trace mitral  regurgitation.     Tricuspid Valve  There is trace tricuspid regurgitation. The right ventricular systolic  pressure is approximated at 6.4 mmHg plus the right atrial pressure.     Aortic Valve  There is mild trileaflet aortic sclerosis. No aortic stenosis is present.     Pulmonic Valve  The pulmonic valve is not well visualized.     Vessels  The aortic root is normal size.     Pericardium  There is no pericardial effusion.     Rhythm  Sinus rhythm was noted.  ______________________________________________________________________________  MMode/2D Measurements & Calculations  IVSd: 0.95 cm     LVIDd: 4.4 cm  LVIDs: 2.4 cm  LVPWd: 0.80 cm  FS: 44.7 %  LV mass(C)d: 123.4 grams  LV mass(C)dI: 84.2  grams/m2  Ao root diam: 3.5 cm  LA dimension: 2.9 cm  asc Aorta Diam: 2.8 cm  LA/Ao: 0.83  LVOT diam: 2.0 cm  LVOT area: 3.2 cm2  LA Volume (BP): 51.2 ml  LA Volume Index (BP): 34.8 ml/m2  RWT: 0.36     Doppler Measurements & Calculations  MV E max ben: 76.2 cm/sec  MV A max ben: 98.0 cm/sec  MV E/A: 0.78  MV dec slope: 520.8 cm/sec2  PA acc time: 0.07 sec  TR max ben: 126.2 cm/sec  TR max P.4 mmHg  E/E' avg: 10.2  Lateral E/e': 8.5  Medial E/e': 11.9     ______________________________________________________________________________  Report approved by: Maddie Emmanuel 07/15/2021 03:19 PM               Discharge Medications   Discharge Medication List as of 2021  1:07 PM      CONTINUE these medications which have NOT CHANGED    Details   CALCIUM 500 MG OR TABS Take 1 tablet by mouth 2 times daily , R-0, Historical      MULTIVITAMIN TABS   OR Take 1 tablet by mouth daily , R-0, Historical      VITAMIN B COMPLEX OR TABS Take 1 tablet by mouth daily , R-0, Historical      VITAMIN E 400 IU OR CAPS Take 400 Units by mouth daily , R-0, Historical           Allergies   Allergies   Allergen Reactions     No Known Drug Allergies

## 2021-07-16 NOTE — PROGRESS NOTES
PRIMARY DIAGNOSIS: VERTIGO     OUTPATIENT/OBSERVATION GOALS TO BE MET BEFORE DISCHARGE  1. Orthostatic performed: Yes.         2. Completion of appropriate imaging: Yes.  Echo and MRI completed and resulted.      3. Tolerating PO medications: Yes.  Tolerating food.  No reports of nausea or emesis.       4. Return to near baseline physical activity: Met.  IND this morning.  Denies feeling lightheaded or dizzy.  Continuing to monitor and assess.      5. Cleared for discharge by consultants (if involved): N/A.      Patient set to discharge today.  Needs appt for staple to be removed before discharge.

## 2021-07-16 NOTE — PROGRESS NOTES
PRIMARY DIAGNOSIS: VERTIGO     OUTPATIENT/OBSERVATION GOALS TO BE MET BEFORE DISCHARGE  1. Orthostatic performed: Yes.         2. Completion of appropriate imaging: Yes.  Echo and MRI completed and resulted.      3. Tolerating PO medications: Yes.  Tolerating food.  No reports of nausea or emesis.       4. Return to near baseline physical activity: Met.  IND this morning.  Denies feeling lightheaded or dizzy.  Continuing to monitor and assess.      5. Cleared for discharge by consultants (if involved): N/A.

## 2021-07-16 NOTE — PLAN OF CARE
RN:  Patient A/O x4.  VSS on RA.  C/O pain at the back of the head.  Staples intact to head laceration (back of scalp) with scant bleeding. Tylenol for pain control PRN.  Up with minimal assistance.  Denies feeling lightheaded or dizzy.  Neuro's intact.  Tele -NSR.  Echo and MRI completed and resulted with normal, negative findings.  Patient hoping to discharge tomorrow.  Plans to fly back to [place] July 28th, 2021.

## 2021-07-16 NOTE — PLAN OF CARE
RN:  Patient discharged to home.  VSS on RA.  Headache pain from head laceration secondary to fall controlled with Tylenol. No complaints of feeling lightheaded or dizzy.  Up IND at time of discharge.  No changes to medication at time of discharge.  Gives verbal understanding of AVS.  Copy of AVS provided to the patient.  Patient discharged with all of her belongings.

## 2021-07-16 NOTE — PROGRESS NOTES
PRIMARY DIAGNOSIS: VERTIGO     OUTPATIENT/OBSERVATION GOALS TO BE MET BEFORE DISCHARGE  1. Orthostatic performed: Yes ( orthostatic b/p checked upon admission.  Patient was orthostatic +)          2. Completion of appropriate imaging: Yes.  Echo and MRI completed and resulted.      3. Tolerating PO medications: Yes.  Tolerating food.  No reports of nausea or emesis.       4. Return to near baseline physical activity: Partially met.  Remains slightly unsteady on feet.  Continuing to monitor and assess.      5. Cleared for discharge by consultants (if involved): No

## 2021-07-16 NOTE — PLAN OF CARE
Physical Therapy: Orders received. Chart reviewed and discussed with care team at rounds.?Per RN, pt doing better this morning & is up ind'lly in room. Physical Therapy not indicated due to pt is up ind'lly, no PT needs identified.? Defer discharge recommendations to medical team.? Will complete orders.

## 2021-07-19 ENCOUNTER — PATIENT OUTREACH (OUTPATIENT)
Dept: FAMILY MEDICINE | Facility: CLINIC | Age: 66
End: 2021-07-19

## 2021-07-19 NOTE — TELEPHONE ENCOUNTER
ED / Discharge Outreach Protocol    Patient Contact  Patient is seen with [facility] DeAnna Phan RN, BSN